# Patient Record
Sex: MALE | Race: WHITE | Employment: FULL TIME | URBAN - METROPOLITAN AREA
[De-identification: names, ages, dates, MRNs, and addresses within clinical notes are randomized per-mention and may not be internally consistent; named-entity substitution may affect disease eponyms.]

---

## 2022-05-11 ENCOUNTER — APPOINTMENT (OUTPATIENT)
Dept: GENERAL RADIOLOGY | Age: 42
End: 2022-05-11

## 2022-05-11 ENCOUNTER — HOSPITAL ENCOUNTER (EMERGENCY)
Age: 42
Discharge: HOME | End: 2022-05-11

## 2022-05-11 VITALS
BODY MASS INDEX: 30.31 KG/M2 | OXYGEN SATURATION: 100 % | TEMPERATURE: 96.9 F | HEART RATE: 69 BPM | HEIGHT: 68 IN | WEIGHT: 200 LBS | DIASTOLIC BLOOD PRESSURE: 89 MMHG | RESPIRATION RATE: 14 BRPM | SYSTOLIC BLOOD PRESSURE: 155 MMHG

## 2022-05-11 DIAGNOSIS — R07.89 CHEST WALL PAIN: Primary | ICD-10-CM

## 2022-05-11 LAB
A/G RATIO: 1.8 (ref 1.1–2.2)
ALBUMIN SERPL-MCNC: 4.5 G/DL (ref 3.4–5)
ALP BLD-CCNC: 104 U/L (ref 40–129)
ALT SERPL-CCNC: 51 U/L (ref 10–40)
ANION GAP SERPL CALCULATED.3IONS-SCNC: 8 MMOL/L (ref 3–16)
AST SERPL-CCNC: 31 U/L (ref 15–37)
BASOPHILS ABSOLUTE: 0 K/UL (ref 0–0.2)
BASOPHILS RELATIVE PERCENT: 0.6 %
BILIRUB SERPL-MCNC: <0.2 MG/DL (ref 0–1)
BUN BLDV-MCNC: 16 MG/DL (ref 7–20)
CALCIUM SERPL-MCNC: 9.4 MG/DL (ref 8.3–10.6)
CHLORIDE BLD-SCNC: 101 MMOL/L (ref 99–110)
CO2: 30 MMOL/L (ref 21–32)
CREAT SERPL-MCNC: 0.8 MG/DL (ref 0.9–1.3)
EOSINOPHILS ABSOLUTE: 0.1 K/UL (ref 0–0.6)
EOSINOPHILS RELATIVE PERCENT: 1.8 %
GFR AFRICAN AMERICAN: >60
GFR NON-AFRICAN AMERICAN: >60
GLUCOSE BLD-MCNC: 93 MG/DL (ref 70–99)
HCT VFR BLD CALC: 44.6 % (ref 40.5–52.5)
HEMOGLOBIN: 15.1 G/DL (ref 13.5–17.5)
LYMPHOCYTES ABSOLUTE: 2.5 K/UL (ref 1–5.1)
LYMPHOCYTES RELATIVE PERCENT: 33.1 %
MCH RBC QN AUTO: 29.5 PG (ref 26–34)
MCHC RBC AUTO-ENTMCNC: 33.8 G/DL (ref 31–36)
MCV RBC AUTO: 87.3 FL (ref 80–100)
MONOCYTES ABSOLUTE: 0.5 K/UL (ref 0–1.3)
MONOCYTES RELATIVE PERCENT: 6.3 %
NEUTROPHILS ABSOLUTE: 4.4 K/UL (ref 1.7–7.7)
NEUTROPHILS RELATIVE PERCENT: 58.2 %
PDW BLD-RTO: 14.5 % (ref 12.4–15.4)
PLATELET # BLD: 263 K/UL (ref 135–450)
PMV BLD AUTO: 7.7 FL (ref 5–10.5)
POTASSIUM REFLEX MAGNESIUM: 4.3 MMOL/L (ref 3.5–5.1)
PRO-BNP: 79 PG/ML (ref 0–124)
RBC # BLD: 5.11 M/UL (ref 4.2–5.9)
SODIUM BLD-SCNC: 139 MMOL/L (ref 136–145)
TOTAL PROTEIN: 7 G/DL (ref 6.4–8.2)
TROPONIN: <0.01 NG/ML
WBC # BLD: 7.6 K/UL (ref 4–11)

## 2022-05-11 PROCEDURE — 36415 COLL VENOUS BLD VENIPUNCTURE: CPT

## 2022-05-11 PROCEDURE — 84484 ASSAY OF TROPONIN QUANT: CPT

## 2022-05-11 PROCEDURE — 99284 EMERGENCY DEPT VISIT MOD MDM: CPT

## 2022-05-11 PROCEDURE — 80053 COMPREHEN METABOLIC PANEL: CPT

## 2022-05-11 PROCEDURE — 85025 COMPLETE CBC W/AUTO DIFF WBC: CPT

## 2022-05-11 PROCEDURE — 71046 X-RAY EXAM CHEST 2 VIEWS: CPT

## 2022-05-11 PROCEDURE — 83880 ASSAY OF NATRIURETIC PEPTIDE: CPT

## 2022-05-11 PROCEDURE — 93005 ELECTROCARDIOGRAM TRACING: CPT

## 2022-05-11 ASSESSMENT — ENCOUNTER SYMPTOMS
COLOR CHANGE: 0
ABDOMINAL PAIN: 0
SORE THROAT: 0
FACIAL SWELLING: 0
RHINORRHEA: 0
SHORTNESS OF BREATH: 0

## 2022-05-11 ASSESSMENT — HEART SCORE: ECG: 0

## 2022-05-11 ASSESSMENT — PAIN DESCRIPTION - ORIENTATION: ORIENTATION: LEFT

## 2022-05-11 ASSESSMENT — PAIN DESCRIPTION - DESCRIPTORS: DESCRIPTORS: SHARP

## 2022-05-11 ASSESSMENT — PAIN SCALES - GENERAL: PAINLEVEL_OUTOF10: 7

## 2022-05-11 ASSESSMENT — PAIN DESCRIPTION - FREQUENCY: FREQUENCY: INTERMITTENT

## 2022-05-11 ASSESSMENT — PAIN DESCRIPTION - PAIN TYPE: TYPE: ACUTE PAIN

## 2022-05-11 ASSESSMENT — PAIN DESCRIPTION - LOCATION: LOCATION: CHEST

## 2022-05-11 NOTE — ED PROVIDER NOTES
The Ekg interpreted by me shows  normal sinus rhythm with a rate of 73  Axis is   Normal  QTc is  normal  Intervals and Durations are unremarkable.       ST Segments: normal  No previous available for comparison           Kip Gold MD  05/11/22 [id number]

## 2022-05-12 LAB
EKG ATRIAL RATE: 73 BPM
EKG DIAGNOSIS: NORMAL
EKG P AXIS: 65 DEGREES
EKG P-R INTERVAL: 166 MS
EKG Q-T INTERVAL: 376 MS
EKG QRS DURATION: 74 MS
EKG QTC CALCULATION (BAZETT): 414 MS
EKG R AXIS: 43 DEGREES
EKG T AXIS: 66 DEGREES
EKG VENTRICULAR RATE: 73 BPM

## 2023-04-21 ENCOUNTER — HOSPITAL ENCOUNTER (EMERGENCY)
Age: 43
Discharge: HOME | End: 2023-04-21
Attending: EMERGENCY MEDICINE
Payer: COMMERCIAL

## 2023-04-21 VITALS
HEART RATE: 87 BPM | RESPIRATION RATE: 16 BRPM | BODY MASS INDEX: 33.34 KG/M2 | OXYGEN SATURATION: 95 % | SYSTOLIC BLOOD PRESSURE: 152 MMHG | DIASTOLIC BLOOD PRESSURE: 90 MMHG | TEMPERATURE: 98.2 F | HEIGHT: 68 IN | WEIGHT: 220 LBS

## 2023-04-21 DIAGNOSIS — L30.9 ECZEMA, UNSPECIFIED TYPE: Primary | ICD-10-CM

## 2023-04-21 PROCEDURE — 6370000000 HC RX 637 (ALT 250 FOR IP): Performed by: EMERGENCY MEDICINE

## 2023-04-21 PROCEDURE — 99283 EMERGENCY DEPT VISIT LOW MDM: CPT

## 2023-04-21 RX ORDER — BUPRENORPHINE AND NALOXONE 8; 2 MG/1; MG/1
FILM, SOLUBLE BUCCAL; SUBLINGUAL
COMMUNITY
Start: 2023-03-25

## 2023-04-21 RX ORDER — PREDNISONE 10 MG/1
TABLET ORAL
Qty: 30 TABLET | Refills: 0 | Status: SHIPPED | OUTPATIENT
Start: 2023-04-21 | End: 2023-05-03

## 2023-04-21 RX ORDER — PREDNISONE 20 MG/1
60 TABLET ORAL ONCE
Status: COMPLETED | OUTPATIENT
Start: 2023-04-21 | End: 2023-04-21

## 2023-04-21 RX ADMIN — PREDNISONE 60 MG: 20 TABLET ORAL at 19:29

## 2023-04-21 ASSESSMENT — PAIN - FUNCTIONAL ASSESSMENT: PAIN_FUNCTIONAL_ASSESSMENT: NONE - DENIES PAIN

## 2023-04-21 NOTE — ED PROVIDER NOTES
Emergency Department Provider Note  Location: [facility] EMERGENCY DEPARTMENT  4/21/2023     Patient Identification  Basim Hendrickson is a 38 y.o. male    Chief Complaint  Rash (Rash throughout body for the last 6 weeks. Was treated with ATB without effect. )      Mode of Arrival  private car    HPI  (History provided by patient)  This is a 38 y.o. male with a PMH significant for asthma  presented today for rash that first erupted 5-6 weeks ago. The rash is on the back and on bilateral lower extremity were it came in contact with his socks. He initially thought it was due to his wife buying new detergent. They switch back to their old detergent but the rash did not clear up. The rash is not very pruritic. He went to urgent care and was told it was \"either bacterial or fungal.\" He was given a course of antibiotic as well as a antifungal cream.  He was told to take the antibiotic and to apply the cream only to his legs. If the legs cleared up but not the rash on his back, then he would know it is a fungus infection. Alternatively, if all the rash cleared up with antibiotic, then he would know it was a bacterial infection. Patient said he took antibiotic and use the fungal cream the rash is not getting better. No fever. No cough. No other complaints, modifying factors or associated symptoms. I have reviewed the following nursing documentation:  Allergies: Allergies   Allergen Reactions    Penicillins Hives    Tramadol Hives       Past medical history:  has a past medical history of Arthritis, Asthma, Chronic back pain, and Herniated nucleus pulposus. Past surgical history:  has no past surgical history on file. Home medications:   Prior to Admission medications    Medication Sig Start Date End Date Taking? Authorizing Provider   buprenorphine-naloxone (SUBOXONE) 8-2 MG FILM SL film  3/25/23   Historical Provider, MD       Social history:  reports that he has been smoking cigarettes.  He has a 17.00

## 2023-04-21 NOTE — ED NOTES
Reviewed patient discharge instructions at this time, copy given to patient. No questions or concerns. Patient voiced understanding.         Elizabeth Lieberman RN  04/21/23 [id number]

## 2023-11-23 ENCOUNTER — HOSPITAL ENCOUNTER (EMERGENCY)
Age: 43
Discharge: HOME | End: 2023-11-24
Attending: EMERGENCY MEDICINE
Payer: COMMERCIAL

## 2023-11-23 ENCOUNTER — APPOINTMENT (OUTPATIENT)
Dept: GENERAL RADIOLOGY | Age: 43
End: 2023-11-23
Attending: EMERGENCY MEDICINE
Payer: COMMERCIAL

## 2023-11-23 DIAGNOSIS — R07.9 CHEST PAIN, UNSPECIFIED TYPE: Primary | ICD-10-CM

## 2023-11-23 LAB
ALBUMIN SERPL-MCNC: 4.4 G/DL (ref 3.4–5)
ALBUMIN/GLOB SERPL: 1.4 {RATIO} (ref 1.1–2.2)
ALP SERPL-CCNC: 93 U/L (ref 40–129)
ALT SERPL-CCNC: 14 U/L (ref 10–40)
ANION GAP SERPL CALCULATED.3IONS-SCNC: 7 MMOL/L (ref 3–16)
AST SERPL-CCNC: 14 U/L (ref 15–37)
BASOPHILS # BLD: 0.1 K/UL (ref 0–0.2)
BASOPHILS NFR BLD: 1.1 %
BILIRUB SERPL-MCNC: <0.2 MG/DL (ref 0–1)
BUN SERPL-MCNC: 15 MG/DL (ref 7–20)
CALCIUM SERPL-MCNC: 9.8 MG/DL (ref 8.3–10.6)
CHLORIDE SERPL-SCNC: 103 MMOL/L (ref 99–110)
CO2 SERPL-SCNC: 30 MMOL/L (ref 21–32)
CREAT SERPL-MCNC: 0.9 MG/DL (ref 0.9–1.3)
DEPRECATED RDW RBC AUTO: 14.2 % (ref 12.4–15.4)
EOSINOPHIL # BLD: 0.2 K/UL (ref 0–0.6)
EOSINOPHIL NFR BLD: 2.1 %
GFR SERPLBLD CREATININE-BSD FMLA CKD-EPI: >60 ML/MIN/{1.73_M2}
GLUCOSE SERPL-MCNC: 120 MG/DL (ref 70–99)
HCT VFR BLD AUTO: 45.3 % (ref 40.5–52.5)
HGB BLD-MCNC: 15.4 G/DL (ref 13.5–17.5)
LYMPHOCYTES # BLD: 3 K/UL (ref 1–5.1)
LYMPHOCYTES NFR BLD: 32.4 %
MCH RBC QN AUTO: 29.3 PG (ref 26–34)
MCHC RBC AUTO-ENTMCNC: 34 G/DL (ref 31–36)
MCV RBC AUTO: 86.2 FL (ref 80–100)
MONOCYTES # BLD: 0.7 K/UL (ref 0–1.3)
MONOCYTES NFR BLD: 7.6 %
NEUTROPHILS # BLD: 5.2 K/UL (ref 1.7–7.7)
NEUTROPHILS NFR BLD: 56.8 %
PLATELET # BLD AUTO: 217 K/UL (ref 135–450)
PMV BLD AUTO: 8.9 FL (ref 5–10.5)
POTASSIUM SERPL-SCNC: 4.1 MMOL/L (ref 3.5–5.1)
PROT SERPL-MCNC: 7.6 G/DL (ref 6.4–8.2)
RBC # BLD AUTO: 5.25 M/UL (ref 4.2–5.9)
SODIUM SERPL-SCNC: 140 MMOL/L (ref 136–145)
TROPONIN, HIGH SENSITIVITY: <6 NG/L (ref 0–22)
WBC # BLD AUTO: 9.1 K/UL (ref 4–11)

## 2023-11-23 PROCEDURE — 99285 EMERGENCY DEPT VISIT HI MDM: CPT

## 2023-11-23 PROCEDURE — 85025 COMPLETE CBC W/AUTO DIFF WBC: CPT

## 2023-11-23 PROCEDURE — 80053 COMPREHEN METABOLIC PANEL: CPT

## 2023-11-23 PROCEDURE — 93005 ELECTROCARDIOGRAM TRACING: CPT | Performed by: EMERGENCY MEDICINE

## 2023-11-23 PROCEDURE — 71045 X-RAY EXAM CHEST 1 VIEW: CPT

## 2023-11-23 PROCEDURE — 6370000000 HC RX 637 (ALT 250 FOR IP): Performed by: EMERGENCY MEDICINE

## 2023-11-23 PROCEDURE — 36415 COLL VENOUS BLD VENIPUNCTURE: CPT

## 2023-11-23 PROCEDURE — 84484 ASSAY OF TROPONIN QUANT: CPT

## 2023-11-23 RX ORDER — ASPIRIN 325 MG
325 TABLET ORAL ONCE
Status: COMPLETED | OUTPATIENT
Start: 2023-11-23 | End: 2023-11-23

## 2023-11-23 RX ORDER — VELPATASVIR AND SOFOSBUVIR 100; 400 MG/1; MG/1
TABLET, FILM COATED ORAL
COMMUNITY
Start: 2023-11-20

## 2023-11-23 RX ADMIN — ASPIRIN 325 MG: 325 TABLET ORAL at 22:54

## 2023-11-23 ASSESSMENT — LIFESTYLE VARIABLES
HOW OFTEN DO YOU HAVE A DRINK CONTAINING ALCOHOL: NEVER
HOW MANY STANDARD DRINKS CONTAINING ALCOHOL DO YOU HAVE ON A TYPICAL DAY: PATIENT DOES NOT DRINK

## 2023-11-23 ASSESSMENT — PAIN - FUNCTIONAL ASSESSMENT: PAIN_FUNCTIONAL_ASSESSMENT: NONE - DENIES PAIN

## 2023-11-24 VITALS
SYSTOLIC BLOOD PRESSURE: 124 MMHG | DIASTOLIC BLOOD PRESSURE: 82 MMHG | RESPIRATION RATE: 18 BRPM | HEIGHT: 68 IN | TEMPERATURE: 98.8 F | OXYGEN SATURATION: 95 % | BODY MASS INDEX: 31.83 KG/M2 | HEART RATE: 79 BPM | WEIGHT: 210 LBS

## 2023-11-24 LAB
EKG ATRIAL RATE: 76 BPM
EKG DIAGNOSIS: NORMAL
EKG P AXIS: 66 DEGREES
EKG P-R INTERVAL: 160 MS
EKG Q-T INTERVAL: 384 MS
EKG QRS DURATION: 76 MS
EKG QTC CALCULATION (BAZETT): 432 MS
EKG R AXIS: 40 DEGREES
EKG T AXIS: 75 DEGREES
EKG VENTRICULAR RATE: 76 BPM
TROPONIN, HIGH SENSITIVITY: <6 NG/L (ref 0–22)

## 2023-11-24 PROCEDURE — 93010 ELECTROCARDIOGRAM REPORT: CPT | Performed by: INTERNAL MEDICINE

## 2023-11-24 ASSESSMENT — PAIN - FUNCTIONAL ASSESSMENT: PAIN_FUNCTIONAL_ASSESSMENT: NONE - DENIES PAIN

## 2023-11-24 NOTE — ED PROVIDER NOTES
[address] [facility]      Pt Name: Liliana Schrader  MRN: [id number]  [address] [facility] [place] 1980  Date of evaluation: 11/23/2023  Provider: Pritesh Sanchez MD    [address]       Chief Complaint   Patient presents with    Chest Pain     Pt ambulates into ED with complaints of left sided chest pain for the past 2-3 months. Also states the pain is in the left arm as well. HISTORY OF PRESENT ILLNESS   (Location/Symptom, Timing/Onset, Context/Setting, Quality, Duration, Modifying Factors, Severity)  Note limiting factors. Liliana Schrader is a 38 y.o. male with past medical history of arthritis, chronic back pain, tobacco use here today with chest pain. The patient states that for well over a year he has been having regular chest pain. He states is hard to describe but sometimes feels sharp sometimes feels achy and sometimes pressure-like. Located in the left side of his chest will radiate into his armpit. No clear alleviating or aggravating factors. Not exertional.  He states it may last for a few minutes up to 15 minutes at a time will usually resolve but comes and goes throughout the day. Was seen in the emergency department in May of last year for the same and states \"it went away for a while\" but has been back for the past few months. He states he just eventually got scared enough to come get evaluated. He has no known history of hypertension or hyperlipidemia. He states he does have a strong family history of cardiac disease with family members that have had bypasses and heart attacks in their 62s. He does have some cousins with stents in their 45s. He is a heavy smoker    HPI    Nursing Notes were reviewed. REVIEW OF SYSTEMS    (2-9 systems for level 4, 10 or more for level 5)     Review of Systems    Please see HPI for pertinent positive and negative review of system findings. A full 10 system ROS was performed and otherwise negative.         PAST MEDICAL

## 2024-10-09 ENCOUNTER — HOSPITAL ENCOUNTER (EMERGENCY)
Age: 44
Discharge: HOME | End: 2024-10-09
Attending: STUDENT IN AN ORGANIZED HEALTH CARE EDUCATION/TRAINING PROGRAM
Payer: COMMERCIAL

## 2024-10-09 VITALS
HEART RATE: 82 BPM | WEIGHT: 215.9 LBS | DIASTOLIC BLOOD PRESSURE: 87 MMHG | HEIGHT: 68 IN | OXYGEN SATURATION: 92 % | BODY MASS INDEX: 32.72 KG/M2 | SYSTOLIC BLOOD PRESSURE: 136 MMHG | TEMPERATURE: 98.1 F | RESPIRATION RATE: 18 BRPM

## 2024-10-09 DIAGNOSIS — L03.90 CELLULITIS, UNSPECIFIED CELLULITIS SITE: Primary | ICD-10-CM

## 2024-10-09 PROCEDURE — 99284 EMERGENCY DEPT VISIT MOD MDM: CPT

## 2024-10-09 PROCEDURE — 6370000000 HC RX 637 (ALT 250 FOR IP): Performed by: STUDENT IN AN ORGANIZED HEALTH CARE EDUCATION/TRAINING PROGRAM

## 2024-10-09 RX ORDER — CLINDAMYCIN HCL 150 MG
450 CAPSULE ORAL 3 TIMES DAILY
Qty: 30 CAPSULE | Refills: 0 | Status: SHIPPED | OUTPATIENT
Start: 2024-10-09 | End: 2024-10-19

## 2024-10-09 RX ORDER — CLINDAMYCIN HCL 150 MG
450 CAPSULE ORAL ONCE
Status: COMPLETED | OUTPATIENT
Start: 2024-10-09 | End: 2024-10-09

## 2024-10-09 RX ADMIN — CLINDAMYCIN HYDROCHLORIDE 450 MG: 150 CAPSULE ORAL at 22:18

## 2024-10-09 ASSESSMENT — PAIN DESCRIPTION - LOCATION: LOCATION: ARM;LEG

## 2024-10-09 ASSESSMENT — PAIN - FUNCTIONAL ASSESSMENT: PAIN_FUNCTIONAL_ASSESSMENT: 0-10

## 2024-10-09 ASSESSMENT — PAIN DESCRIPTION - PAIN TYPE: TYPE: ACUTE PAIN

## 2024-10-09 ASSESSMENT — PAIN SCALES - GENERAL: PAINLEVEL_OUTOF10: 8

## 2024-10-09 ASSESSMENT — PAIN DESCRIPTION - ORIENTATION: ORIENTATION: LEFT;RIGHT

## 2024-10-09 ASSESSMENT — PAIN DESCRIPTION - FREQUENCY: FREQUENCY: CONTINUOUS

## 2024-10-10 NOTE — ED PROVIDER NOTES
education level: Not on file   Occupational History    Occupation: Labor   Tobacco Use    Smoking status: Every Day     Current packs/day: 1.00     Average packs/day: 1 pack/day for 17.0 years (17.0 ttl pk-yrs)     Types: Cigarettes    Smokeless tobacco: Never   Vaping Use    Vaping status: Never Used   Substance and Sexual Activity    Alcohol use: No    Drug use: No     Comment: clean for 8 years    Sexual activity: Yes     Partners: Female   Other Topics Concern    Not on file   Social History Narrative    Not on file     Social Determinants of Health     Financial Resource Strain: Not on file   Food Insecurity: Not on file (11/23/2023)   Transportation Needs: Not on file   Physical Activity: Not on file   Stress: Not on file   Social Connections: Not on file   Intimate Partner Violence: Not on file   Housing Stability: Not on file     No current facility-administered medications for this encounter.     Current Outpatient Medications   Medication Sig Dispense Refill    clindamycin (CLEOCIN) 150 MG capsule Take 3 capsules by mouth 3 times daily for 10 days 30 capsule 0    Sofosbuvir-Velpatasvir 400-100 MG TABS       buprenorphine-naloxone (SUBOXONE) 8-2 MG FILM SL film        Allergies   Allergen Reactions    Penicillins Hives    Tramadol Hives       Nursing Notes Reviewed    Physical Exam:  Triage VS:    ED Triage Vitals [10/09/24 2121]   Encounter Vitals Group      BP (!) 155/95      Systolic BP Percentile       Diastolic BP Percentile       Pulse 70      Respirations 18      Temp 98.1 °F (36.7 °C)      Temp Source Oral      SpO2 94 %      Weight - Scale 97.9 kg (215 lb 14.4 oz)      Height 1.727 m (5' 8\")      Head Circumference       Peak Flow       Pain Score       Pain Loc       Pain Education       Exclude from Growth Chart        My pulse ox interpretation is - normal    General appearance:  No acute distress.   Skin:  Warm. Dry.   Eye:  Extraocular movements intact.     Ears, nose, mouth and throat:  Oral  Cellulitis, unspecified cellulitis site          Comment: Please note this report has been produced using speech recognition software and may contain errors related to that system including errors in grammar, punctuation, and spelling, as well as words and phrases that may be inappropriate.  Efforts were made to edit the dictations.